# Patient Record
Sex: MALE | Race: OTHER | NOT HISPANIC OR LATINO
[De-identification: names, ages, dates, MRNs, and addresses within clinical notes are randomized per-mention and may not be internally consistent; named-entity substitution may affect disease eponyms.]

---

## 2017-12-11 ENCOUNTER — APPOINTMENT (OUTPATIENT)
Dept: SURGERY | Facility: CLINIC | Age: 72
End: 2017-12-11
Payer: MEDICARE

## 2017-12-11 VITALS
HEIGHT: 65 IN | TEMPERATURE: 97.7 F | BODY MASS INDEX: 25.9 KG/M2 | SYSTOLIC BLOOD PRESSURE: 170 MMHG | WEIGHT: 155.44 LBS | HEART RATE: 71 BPM | DIASTOLIC BLOOD PRESSURE: 90 MMHG | OXYGEN SATURATION: 97 %

## 2017-12-11 DIAGNOSIS — R22.31 LOCALIZED SWELLING, MASS AND LUMP, RIGHT UPPER LIMB: ICD-10-CM

## 2017-12-11 DIAGNOSIS — L72.3 SEBACEOUS CYST: ICD-10-CM

## 2017-12-11 PROCEDURE — 99204 OFFICE O/P NEW MOD 45 MIN: CPT

## 2017-12-21 PROBLEM — L72.3 SEBACEOUS CYST: Status: ACTIVE | Noted: 2017-12-21

## 2018-01-03 ENCOUNTER — FORM ENCOUNTER (OUTPATIENT)
Age: 73
End: 2018-01-03

## 2018-01-04 ENCOUNTER — OUTPATIENT (OUTPATIENT)
Dept: OUTPATIENT SERVICES | Facility: HOSPITAL | Age: 73
LOS: 1 days | End: 2018-01-04
Payer: MEDICARE

## 2018-01-04 PROCEDURE — 76641 ULTRASOUND BREAST COMPLETE: CPT

## 2018-01-04 PROCEDURE — 76536 US EXAM OF HEAD AND NECK: CPT

## 2018-01-04 PROCEDURE — 76536 US EXAM OF HEAD AND NECK: CPT | Mod: 26

## 2018-01-04 PROCEDURE — 76641 ULTRASOUND BREAST COMPLETE: CPT | Mod: 26,RT

## 2018-01-22 ENCOUNTER — APPOINTMENT (OUTPATIENT)
Dept: OTOLARYNGOLOGY | Facility: CLINIC | Age: 73
End: 2018-01-22
Payer: MEDICARE

## 2018-01-22 VITALS
SYSTOLIC BLOOD PRESSURE: 180 MMHG | HEIGHT: 65 IN | BODY MASS INDEX: 24.99 KG/M2 | DIASTOLIC BLOOD PRESSURE: 94 MMHG | WEIGHT: 150 LBS | HEART RATE: 74 BPM | TEMPERATURE: 97.7 F

## 2018-01-22 DIAGNOSIS — Z82.49 FAMILY HISTORY OF ISCHEMIC HEART DISEASE AND OTHER DISEASES OF THE CIRCULATORY SYSTEM: ICD-10-CM

## 2018-01-22 PROCEDURE — 31575 DIAGNOSTIC LARYNGOSCOPY: CPT

## 2018-01-22 PROCEDURE — 99204 OFFICE O/P NEW MOD 45 MIN: CPT | Mod: 25

## 2018-01-22 RX ORDER — SIMVASTATIN 10 MG/1
10 TABLET, FILM COATED ORAL
Refills: 0 | Status: ACTIVE | COMMUNITY

## 2019-11-19 ENCOUNTER — RECORD ABSTRACTING (OUTPATIENT)
Age: 74
End: 2019-11-19

## 2019-11-19 DIAGNOSIS — Z80.1 FAMILY HISTORY OF MALIGNANT NEOPLASM OF TRACHEA, BRONCHUS AND LUNG: ICD-10-CM

## 2019-11-19 LAB
PSA FREE FLD-MCNC: 14
PSA FREE SERPL-MCNC: 1.4
PSA SERPL-MCNC: 9.7
TESTOST BND SERPL-MCNC: 283.8

## 2019-12-13 ENCOUNTER — APPOINTMENT (OUTPATIENT)
Dept: UROLOGY | Facility: CLINIC | Age: 74
End: 2019-12-13
Payer: MEDICARE

## 2019-12-13 ENCOUNTER — APPOINTMENT (OUTPATIENT)
Dept: UROLOGY | Facility: CLINIC | Age: 74
End: 2019-12-13

## 2019-12-13 DIAGNOSIS — Z00.00 ENCOUNTER FOR GENERAL ADULT MEDICAL EXAMINATION W/OUT ABNORMAL FINDINGS: ICD-10-CM

## 2019-12-13 LAB
BILIRUB UR QL STRIP: NEGATIVE
CLARITY UR: NORMAL
COLLECTION METHOD: NORMAL
GLUCOSE UR-MCNC: NEGATIVE
HCG UR QL: 0.2 EU/DL
HGB UR QL STRIP.AUTO: NEGATIVE
KETONES UR-MCNC: NEGATIVE
LEUKOCYTE ESTERASE UR QL STRIP: NEGATIVE
NITRITE UR QL STRIP: NEGATIVE
PH UR STRIP: 7
PROT UR STRIP-MCNC: NORMAL
SP GR UR STRIP: 1.02

## 2019-12-13 PROCEDURE — 99215 OFFICE O/P EST HI 40 MIN: CPT

## 2019-12-13 PROCEDURE — 81003 URINALYSIS AUTO W/O SCOPE: CPT | Mod: QW

## 2019-12-13 PROCEDURE — 76857 US EXAM PELVIC LIMITED: CPT

## 2019-12-13 NOTE — LETTER BODY
[Consult Letter:] : I had the pleasure of evaluating your patient, [unfilled]. [Dear  ___] : Dear  [unfilled], [Consult Closing:] : Thank you very much for allowing me to participate in the care of this patient.  If you have any questions, please do not hesitate to contact me. [Sincerely,] : Sincerely, [Please see my note below.] : Please see my note below. [FreeTextEntry3] : Samy Blackwell MD, FACS\par

## 2019-12-13 NOTE — ASSESSMENT
[FreeTextEntry1] : I discussed the findings and options in detail with Mr. Chacko.  He will continue with the combination therapy for his LUTS.  I have also given him another prescription for the Cialis to try prn. \par \par I look forward to seeing Mr. Chacko in one year (sono, PSA), providing the PSA is stable.

## 2019-12-13 NOTE — HISTORY OF PRESENT ILLNESS
[FreeTextEntry1] : Mr. ROD BOURNE comes in today for his urologic follow-up.  He reports minimal stable LUTS with nocturia x1, and is continuing on alfuzosin and finasteride.\par IPSS: 1/35 (pleased)\par Sono:  91cc PVR; 174cc prostate\par \par He has a long history of elevated PSAs and he has had 4 prostate biopsies from 4881-7702.\par PSAs: 6/22/10--3.6;  2/24/11--3.6; 1/10/14--5.1; 9/24/14--4.7; 12/2/15--5.5; 9/14/16--5.5; 5/26/17--5.8; 5/23/18--5.6; 5/29/19--9.7 (14%); \par \par The ED is unchanged. He has not tried the Cialis, previously prescribed, because of his wife's hesitation.\par \par

## 2019-12-13 NOTE — PHYSICAL EXAM
[General Appearance - Well Developed] : well developed [General Appearance - Well Nourished] : well nourished [Normal Appearance] : normal appearance [Well Groomed] : well groomed [Abdomen Soft] : soft [General Appearance - In No Acute Distress] : no acute distress [Abdomen Tenderness] : non-tender [Urethral Meatus] : meatus normal [Costovertebral Angle Tenderness] : no ~M costovertebral angle tenderness [Penis Abnormality] : normal uncircumcised penis [Urinary Bladder Findings] : the bladder was normal on palpation [Scrotum] : the scrotum was normal [Testes Tenderness] : no tenderness of the testes [Rectal Exam - Rectum] : digital rectal exam was normal [Prostate Tenderness] : the prostate was not tender [Testes Mass (___cm)] : there were no testicular masses [No Prostate Nodules] : no prostate nodules [Skin Color & Pigmentation] : normal skin color and pigmentation [Heart Rate And Rhythm] : Heart rate and rhythm were normal [] : no respiratory distress [Edema] : no peripheral edema [Respiration, Rhythm And Depth] : normal respiratory rhythm and effort [Exaggerated Use Of Accessory Muscles For Inspiration] : no accessory muscle use [Oriented To Time, Place, And Person] : oriented to person, place, and time [Mood] : the mood was normal [Affect] : the affect was normal [Not Anxious] : not anxious [Normal Station and Gait] : the gait and station were normal for the patient's age [No Palpable Adenopathy] : no palpable adenopathy [No Focal Deficits] : no focal deficits [FreeTextEntry1] : MIld testicular asymmetry (Lt<Rt). External hemorrhoids

## 2019-12-16 LAB — PSA SERPL-MCNC: 7.2 NG/ML

## 2020-01-16 ENCOUNTER — FORM ENCOUNTER (OUTPATIENT)
Age: 75
End: 2020-01-16

## 2020-01-17 ENCOUNTER — OUTPATIENT (OUTPATIENT)
Dept: OUTPATIENT SERVICES | Facility: HOSPITAL | Age: 75
LOS: 1 days | End: 2020-01-17
Payer: MEDICARE

## 2020-01-17 ENCOUNTER — APPOINTMENT (OUTPATIENT)
Dept: MRI IMAGING | Facility: HOSPITAL | Age: 75
End: 2020-01-17
Payer: MEDICARE

## 2020-01-17 PROCEDURE — 72196 MRI PELVIS W/DYE: CPT

## 2020-01-17 PROCEDURE — 72196 MRI PELVIS W/DYE: CPT | Mod: 26

## 2020-01-17 PROCEDURE — A9585: CPT

## 2020-06-25 ENCOUNTER — APPOINTMENT (OUTPATIENT)
Dept: UROLOGY | Facility: CLINIC | Age: 75
End: 2020-06-25

## 2020-06-25 ENCOUNTER — APPOINTMENT (OUTPATIENT)
Dept: UROLOGY | Facility: CLINIC | Age: 75
End: 2020-06-25
Payer: MEDICARE

## 2020-06-25 VITALS
HEART RATE: 76 BPM | DIASTOLIC BLOOD PRESSURE: 90 MMHG | BODY MASS INDEX: 24.99 KG/M2 | WEIGHT: 150 LBS | SYSTOLIC BLOOD PRESSURE: 165 MMHG | HEIGHT: 65 IN | TEMPERATURE: 98 F

## 2020-06-25 DIAGNOSIS — Z78.9 OTHER SPECIFIED HEALTH STATUS: ICD-10-CM

## 2020-06-25 DIAGNOSIS — E78.5 HYPERLIPIDEMIA, UNSPECIFIED: ICD-10-CM

## 2020-06-25 LAB
BILIRUB UR QL STRIP: NORMAL
CLARITY UR: CLEAR
COLLECTION METHOD: NORMAL
GLUCOSE UR-MCNC: NORMAL
HCG UR QL: 0.2 EU/DL
HGB UR QL STRIP.AUTO: NORMAL
KETONES UR-MCNC: NORMAL
LEUKOCYTE ESTERASE UR QL STRIP: NORMAL
NITRITE UR QL STRIP: NORMAL
PH UR STRIP: 6.5
PROT UR STRIP-MCNC: 30
SP GR UR STRIP: 1.03

## 2020-06-25 PROCEDURE — 99214 OFFICE O/P EST MOD 30 MIN: CPT

## 2020-06-25 PROCEDURE — 81003 URINALYSIS AUTO W/O SCOPE: CPT | Mod: QW

## 2020-06-25 PROCEDURE — 76857 US EXAM PELVIC LIMITED: CPT

## 2020-06-25 RX ORDER — EZETIMIBE AND SIMVASTATIN 10; 80 MG/1; MG/1
TABLET ORAL
Refills: 0 | Status: DISCONTINUED | COMMUNITY
End: 2020-06-25

## 2020-06-25 NOTE — LETTER BODY
[Dear  ___] : Dear  [unfilled], [Consult Letter:] : I had the pleasure of evaluating your patient, [unfilled]. [Please see my note below.] : Please see my note below. [Consult Closing:] : Thank you very much for allowing me to participate in the care of this patient.  If you have any questions, please do not hesitate to contact me. [Sincerely,] : Sincerely, [FreeTextEntry3] : Samy Blackwell MD, FACS\par

## 2020-06-25 NOTE — ASSESSMENT
[FreeTextEntry1] : I discussed the findings and options with Mr. ROD BOURNE in detail.\par He will continue with the combination therapy for his voiding symptoms.  Mr. Bourne does not want any treatment for ED.\par \par I would like to see Mr. Bourne in one year (bladder sono).  He will be due for a PSA in Dec 2020.

## 2020-06-25 NOTE — HISTORY OF PRESENT ILLNESS
[FreeTextEntry1] : Mr. ROD BOURNE comes in today for his urologic follow-up.  He reports minimal stable lower urinary tract symptoms and nocturia x1. He is continuing on alfuzosin and finasteride. (Mr. Bourne had laser prostatectomies in 2007 and 2008).\par IPSS: 3/35\par Sono:  42cc PVR; 190cc prostate\par \par He has a long history of elevated PSAs (see below) and he has had 4 prostate biopsies from 9480-1600 (results not available).\par \par The ED is unchanged. He has not tried the Cialis, previously prescribed, because of his wife's hesitation.\par \par (There is a telephone communication in the chart from Feb 2020 suggesting that Mr. Bourne had gross hematuria.  He does not recall this and feels it is an error.  He denies every seeing blood in the urine).\par \par PSAs: 6/22/10--3.6;  2/24/11--3.6; 1/10/14--5.1; 9/24/14--4.7; 12/2/15--5.5; 9/14/16--5.5; 5/26/17--5.8; 5/23/18--5.6; 5/29/19--9.7 (14%); 12/13/19--7.2\par \par

## 2020-06-25 NOTE — PHYSICAL EXAM
[General Appearance - Well Developed] : well developed [Well Groomed] : well groomed [General Appearance - In No Acute Distress] : no acute distress [Normal Appearance] : normal appearance [General Appearance - Well Nourished] : well nourished [Abdomen Soft] : soft [Costovertebral Angle Tenderness] : no ~M costovertebral angle tenderness [Abdomen Tenderness] : non-tender [Penis Abnormality] : normal uncircumcised penis [Urinary Bladder Findings] : the bladder was normal on palpation [Urethral Meatus] : meatus normal [Testes Tenderness] : no tenderness of the testes [Rectal Exam - Rectum] : digital rectal exam was normal [Scrotum] : the scrotum was normal [Testes Mass (___cm)] : there were no testicular masses [No Prostate Nodules] : no prostate nodules [Skin Color & Pigmentation] : normal skin color and pigmentation [Prostate Tenderness] : the prostate was not tender [Heart Rate And Rhythm] : Heart rate and rhythm were normal [Edema] : no peripheral edema [Respiration, Rhythm And Depth] : normal respiratory rhythm and effort [Exaggerated Use Of Accessory Muscles For Inspiration] : no accessory muscle use [Oriented To Time, Place, And Person] : oriented to person, place, and time [] : no respiratory distress [Affect] : the affect was normal [Mood] : the mood was normal [Not Anxious] : not anxious [Normal Station and Gait] : the gait and station were normal for the patient's age [No Palpable Adenopathy] : no palpable adenopathy [No Focal Deficits] : no focal deficits [Abdomen Mass (___ Cm)] : no abdominal mass palpated [Abdomen Hernia] : no hernia was discovered [Epididymis] : the epididymides were normal [FreeTextEntry1] : MIld testicular asymmetry (Lt<Rt). External hemorrhoids

## 2020-06-26 LAB
APPEARANCE: CLEAR
BACTERIA: NEGATIVE
BILIRUBIN URINE: NEGATIVE
BLOOD URINE: NEGATIVE
COLOR: NORMAL
GLUCOSE QUALITATIVE U: NEGATIVE
HYALINE CASTS: 4 /LPF
KETONES URINE: NEGATIVE
LEUKOCYTE ESTERASE URINE: ABNORMAL
MICROSCOPIC-UA: NORMAL
NITRITE URINE: NEGATIVE
PH URINE: 6.5
PROTEIN URINE: ABNORMAL
RED BLOOD CELLS URINE: 2 /HPF
SPECIFIC GRAVITY URINE: 1.02
SQUAMOUS EPITHELIAL CELLS: 8 /HPF
URINE CYTOLOGY: NORMAL
UROBILINOGEN URINE: NORMAL
WHITE BLOOD CELLS URINE: 11 /HPF

## 2020-06-29 LAB — BACTERIA UR CULT: NORMAL

## 2021-03-08 ENCOUNTER — APPOINTMENT (OUTPATIENT)
Dept: UROLOGY | Facility: CLINIC | Age: 76
End: 2021-03-08
Payer: MEDICARE

## 2021-03-08 VITALS
BODY MASS INDEX: 24.99 KG/M2 | HEIGHT: 65 IN | SYSTOLIC BLOOD PRESSURE: 160 MMHG | OXYGEN SATURATION: 98 % | DIASTOLIC BLOOD PRESSURE: 88 MMHG | TEMPERATURE: 98 F | WEIGHT: 150 LBS | HEART RATE: 77 BPM

## 2021-03-08 VITALS — BODY MASS INDEX: 24.99 KG/M2 | HEIGHT: 65 IN | WEIGHT: 150 LBS

## 2021-03-08 LAB
BILIRUB UR QL STRIP: NORMAL
CLARITY UR: CLEAR
COLLECTION METHOD: NORMAL
GLUCOSE UR-MCNC: NORMAL
HCG UR QL: 0.2 EU/DL
HGB UR QL STRIP.AUTO: NORMAL
KETONES UR-MCNC: NORMAL
LEUKOCYTE ESTERASE UR QL STRIP: NORMAL
NITRITE UR QL STRIP: NORMAL
PH UR STRIP: 6
PROT UR STRIP-MCNC: NORMAL
SP GR UR STRIP: 1.01

## 2021-03-08 PROCEDURE — 76857 US EXAM PELVIC LIMITED: CPT

## 2021-03-08 PROCEDURE — 99215 OFFICE O/P EST HI 40 MIN: CPT

## 2021-03-08 PROCEDURE — 81003 URINALYSIS AUTO W/O SCOPE: CPT | Mod: QW

## 2021-03-08 RX ORDER — ATORVASTATIN CALCIUM 20 MG/1
20 TABLET, FILM COATED ORAL
Qty: 90 | Refills: 0 | Status: ACTIVE | COMMUNITY
Start: 2020-12-16

## 2021-03-08 RX ORDER — LISINOPRIL 20 MG/1
20 TABLET ORAL
Refills: 0 | Status: DISCONTINUED | COMMUNITY
End: 2021-03-08

## 2021-03-08 RX ORDER — AMLODIPINE BESYLATE 5 MG/1
5 TABLET ORAL
Qty: 90 | Refills: 0 | Status: ACTIVE | COMMUNITY
Start: 2020-12-16

## 2021-03-08 RX ORDER — LISINOPRIL AND HYDROCHLOROTHIAZIDE TABLETS 20; 12.5 MG/1; MG/1
20-12.5 TABLET ORAL
Qty: 180 | Refills: 0 | Status: ACTIVE | COMMUNITY
Start: 2020-07-10

## 2021-03-08 NOTE — PHYSICAL EXAM
[General Appearance - Well Developed] : well developed [General Appearance - Well Nourished] : well nourished [Normal Appearance] : normal appearance [Well Groomed] : well groomed [General Appearance - In No Acute Distress] : no acute distress [Abdomen Soft] : soft [Abdomen Tenderness] : non-tender [Abdomen Mass (___ Cm)] : no abdominal mass palpated [Abdomen Hernia] : no hernia was discovered [Costovertebral Angle Tenderness] : no ~M costovertebral angle tenderness [Urethral Meatus] : meatus normal [Penis Abnormality] : normal uncircumcised penis [Urinary Bladder Findings] : the bladder was normal on palpation [Scrotum] : the scrotum was normal [Epididymis] : the epididymides were normal [Testes Tenderness] : no tenderness of the testes [Testes Mass (___cm)] : there were no testicular masses [Rectal Exam - Rectum] : digital rectal exam was normal [Prostate Tenderness] : the prostate was not tender [No Prostate Nodules] : no prostate nodules [Skin Color & Pigmentation] : normal skin color and pigmentation [Heart Rate And Rhythm] : Heart rate and rhythm were normal [Edema] : no peripheral edema [] : no respiratory distress [Respiration, Rhythm And Depth] : normal respiratory rhythm and effort [Exaggerated Use Of Accessory Muscles For Inspiration] : no accessory muscle use [Oriented To Time, Place, And Person] : oriented to person, place, and time [Affect] : the affect was normal [Mood] : the mood was normal [Not Anxious] : not anxious [Normal Station and Gait] : the gait and station were normal for the patient's age [No Focal Deficits] : no focal deficits [No Palpable Adenopathy] : no palpable adenopathy [FreeTextEntry1] : MIld testicular asymmetry (Lt<Rt). External hemorrhoids

## 2021-03-08 NOTE — HISTORY OF PRESENT ILLNESS
[FreeTextEntry1] : Mr. ROD BOURNE comes in today for his urologic follow-up.  He reports minimal stable lower urinary tract symptoms and nocturia x1. He is continuing on alfuzosin and finasteride. Mr. Bourne had laser prostatectomies in 2007 and 2008.\par IPSS: \par Sono:  139cc PVR; 160cc prostate\par \par He has a long history of elevated PSAs (see below) and he has had 4 prostate biopsies from [id number] (results not available).\par \par The ED is unchanged. He has not tried the Cialis, previously prescribed, because of his wife's hesitation.\par \par (There is a telephone communication in the chart from Feb 2020 suggesting that Mr. Bourne had gross hematuria.  He does not recall this and feels it is an error.  He denies every seeing blood in the urine).\par \par PSAs: 6/22/10--3.6;  2/24/11--3.6; 1/10/14--5.1; 9/24/14--4.7; 12/2/15--5.5; 9/14/16--5.5; 5/26/17--5.8; 5/23/18--5.6; 5/29/19--9.7 (14%); 12/13/19--7.2\par \par

## 2021-03-08 NOTE — ADDENDUM
[FreeTextEntry1] : A portion of this note was written by [Miguel Angel Banks] on 03/05/2021 acting as a scribe for Dr. Blackwell. \par \par I have personally reviewed the chart and agree that the record accurately reflects my personal performance of the history, physical exam, assessment, and plan.

## 2021-03-08 NOTE — LETTER BODY
[Dear  ___] : Dear  [unfilled], [Consult Letter:] : I had the pleasure of evaluating your patient, [unfilled]. [Please see my note below.] : Please see my note below. [Consult Closing:] : Thank you very much for allowing me to participate in the care of this patient.  If you have any questions, please do not hesitate to contact me. [Sincerely,] : Sincerely, [DrAldo  ___] : Dr. HOANG [FreeTextEntry3] : Samy Blackwell MD, FACS\par

## 2021-03-08 NOTE — ASSESSMENT
[FreeTextEntry1] : I discussed the findings and options with . ROD BOURNE in detail.\par He will continue with the combination therapy for his voiding symptoms as he does not want any additional intervention.\par \par Mr. Bourne now seems more interested in pursuing treatment for his erectile dysfunction.  I again reviewed the options with him and prescribed Cialis 20 mg (1/2 dose). \par \par Providing there are no new problems and the PSA remains stable, I look forward to seeing Mr. Bourne, electively, in 1 year (bladder sono, PSA).

## 2021-03-09 ENCOUNTER — NON-APPOINTMENT (OUTPATIENT)
Age: 76
End: 2021-03-09

## 2021-03-09 LAB — PSA SERPL-MCNC: 7.38 NG/ML

## 2021-06-24 ENCOUNTER — APPOINTMENT (OUTPATIENT)
Dept: UROLOGY | Facility: CLINIC | Age: 76
End: 2021-06-24

## 2021-10-07 ENCOUNTER — APPOINTMENT (OUTPATIENT)
Dept: UROLOGY | Facility: CLINIC | Age: 76
End: 2021-10-07
Payer: MEDICARE

## 2021-10-07 VITALS
WEIGHT: 150 LBS | SYSTOLIC BLOOD PRESSURE: 157 MMHG | BODY MASS INDEX: 24.99 KG/M2 | DIASTOLIC BLOOD PRESSURE: 74 MMHG | HEIGHT: 65 IN | TEMPERATURE: 98.2 F | HEART RATE: 77 BPM

## 2021-10-07 DIAGNOSIS — Z78.9 OTHER SPECIFIED HEALTH STATUS: ICD-10-CM

## 2021-10-07 PROCEDURE — 99214 OFFICE O/P EST MOD 30 MIN: CPT

## 2021-10-07 PROCEDURE — 76857 US EXAM PELVIC LIMITED: CPT

## 2021-10-07 RX ORDER — ALFUZOSIN HYDROCHLORIDE 10 MG/1
10 TABLET, EXTENDED RELEASE ORAL DAILY
Qty: 90 | Refills: 3 | Status: ACTIVE | COMMUNITY
Start: 2019-12-13 | End: 1900-01-01

## 2021-10-07 NOTE — ASSESSMENT
[FreeTextEntry1] : I discussed the findings and options with . ROD BOURNE in detail.\par He will continue with the combination therapy for his voiding symptoms as he does not want any additional intervention.\par \par Mr. Bourne now seems more interested in pursuing treatment for his erectile dysfunction.  I again reviewed the options with him and re-prescribed Cialis 20 mg (1/2 dose). \par \par Providing there are no new problems and the PSA remains stable, I look forward to seeing Mr. Bourne, electively, in 1 year (bladder sono, PSA).

## 2021-10-07 NOTE — ADDENDUM
[FreeTextEntry1] : A portion of this note was written by [Miguel Angel Banks] on 06/22/2021 acting as a scribe for Dr. Blackwell. \par \par I have personally reviewed the chart and agree that the record accurately reflects my personal performance of the history, physical exam, assessment, and plan.

## 2021-10-07 NOTE — HISTORY OF PRESENT ILLNESS
[FreeTextEntry1] : Mr. ROD BOURNE comes in today for his semiannual urologic follow-up.  He reports minimal stable lower urinary tract symptoms and nocturia x1. He is continuing on alfuzosin and finasteride. Mr. Bourne had laser prostatectomies in 2007 and 2008.\par IPSS: 5/35\par Sono: 151cc PVR; 176cc prostate\par \par He has a long history of elevated PSAs (see below) and he has had 4 prostate biopsies from [id number] (results not available).\par \par The ED is unchanged. He has not tried the Cialis, previously prescribed, because of his wife's hesitation.\par \par (There is a telephone communication in the chart from Feb 2020 suggesting that Mr. Bourne had gross hematuria.  He does not recall this and feels it is an error.  He denies every seeing blood in the urine).\par \par PSAs: 6/22/10--3.6;  2/24/11--3.6; 1/10/14--5.1; 9/24/14--4.7; 12/2/15--5.5; 9/14/16--5.5; 5/26/17--5.8; 5/23/18--5.6; 5/29/19--9.7 (14%); 12/13/19--7.2; 3/8/21--7.38\par \par

## 2021-10-08 ENCOUNTER — NON-APPOINTMENT (OUTPATIENT)
Age: 76
End: 2021-10-08

## 2021-10-08 LAB — PSA SERPL-MCNC: 7.27 NG/ML

## 2021-10-15 ENCOUNTER — NON-APPOINTMENT (OUTPATIENT)
Age: 76
End: 2021-10-15

## 2022-04-14 ENCOUNTER — APPOINTMENT (OUTPATIENT)
Dept: UROLOGY | Facility: CLINIC | Age: 77
End: 2022-04-14
Payer: MEDICARE

## 2022-04-14 VITALS
HEART RATE: 85 BPM | RESPIRATION RATE: 18 BRPM | DIASTOLIC BLOOD PRESSURE: 79 MMHG | HEIGHT: 65 IN | SYSTOLIC BLOOD PRESSURE: 163 MMHG | TEMPERATURE: 98 F | WEIGHT: 145 LBS | BODY MASS INDEX: 24.16 KG/M2

## 2022-04-14 DIAGNOSIS — R97.20 ELEVATED PROSTATE, SPECIFIC ANTIGEN [PSA]: ICD-10-CM

## 2022-04-14 DIAGNOSIS — R35.1 NOCTURIA: ICD-10-CM

## 2022-04-14 DIAGNOSIS — R33.9 RETENTION OF URINE, UNSPECIFIED: ICD-10-CM

## 2022-04-14 DIAGNOSIS — Q55.9 CONGENITAL MALFORMATION OF MALE GENITAL ORGAN, UNSPECIFIED: ICD-10-CM

## 2022-04-14 DIAGNOSIS — N52.01 ERECTILE DYSFUNCTION DUE TO ARTERIAL INSUFFICIENCY: ICD-10-CM

## 2022-04-14 DIAGNOSIS — N53.14 RETROGRADE EJACULATION: ICD-10-CM

## 2022-04-14 DIAGNOSIS — R39.9 UNSPECIFIED SYMPTOMS AND SIGNS INVOLVING THE GENITOURINARY SYSTEM: ICD-10-CM

## 2022-04-14 PROCEDURE — 76857 US EXAM PELVIC LIMITED: CPT

## 2022-04-14 PROCEDURE — 99215 OFFICE O/P EST HI 40 MIN: CPT

## 2022-04-14 RX ORDER — FINASTERIDE 5 MG/1
5 TABLET, FILM COATED ORAL DAILY
Qty: 90 | Refills: 3 | Status: ACTIVE | COMMUNITY
Start: 2019-12-13 | End: 1900-01-01

## 2022-04-14 RX ORDER — TADALAFIL 20 MG/1
20 TABLET ORAL
Qty: 10 | Refills: 6 | Status: ACTIVE | COMMUNITY
Start: 2019-12-13 | End: 1900-01-01

## 2022-04-14 RX ORDER — HYDROCHLOROTHIAZIDE 12.5 MG/1
TABLET ORAL
Refills: 0 | Status: DISCONTINUED | COMMUNITY
End: 2022-04-14

## 2022-04-14 RX ORDER — ALFUZOSIN HYDROCHLORIDE 10 MG/1
10 TABLET, EXTENDED RELEASE ORAL
Qty: 90 | Refills: 3 | Status: ACTIVE | COMMUNITY
Start: 2022-03-15 | End: 1900-01-01

## 2022-04-14 NOTE — HISTORY OF PRESENT ILLNESS
[FreeTextEntry1] : Mr. ROD BOURNE comes in today for his semiannual urologic follow-up.  He reports minimal stable lower urinary tract symptoms and nocturia x 1. He is continuing on alfuzosin and finasteride. Mr. Bourne had laser prostatectomies in 2007 and 2008.\par IPSS: 5/35\par Sono (performed to assess bladder emptying): 102cc PVR; 190cc prostate\par \par He has a long history of elevated PSAs (see below) and he has had 4 prostate biopsies from [id number] (results not available).\par \par Mr. Bourne has longstanding ED.  He has used Cialis 20mg on occasion.\par \par PSAs: 10/7/21--7.27; 3/8/21--7.38; 12/13/19--7.2; 5/29/19--9.7 (14%); 5/23/18--5.6; 5/26/17--5.8; 9/14/16--5.5; 12/2/15--5.5; 9/24/14--4.7; 1/10/14--5.1; 2/24/11--3.6; 6/22/10--3.6;  \par \par

## 2022-04-14 NOTE — ASSESSMENT
[FreeTextEntry1] : I discussed the findings and options with . ROD BOURNE and his wife in detail.  He is satisfied with his urination and his residuals have remained stable.  Consequently we will simply continue on the combination pharmacologic therapy.\par \par Mr. Bourne will use Cialis either at the 10 or 20 mg dose as needed for the erectile dysfunction.\par \par Providing the PSA remains stable and there are no new problems, I look forward to seeing him in 1 year (bladder sono, PSA).

## 2022-04-14 NOTE — ADDENDUM
[FreeTextEntry1] : A portion of this note was written by [Miguel Angel Banks] on 04/12/2022 acting as a scribe for Dr. Blackwell. \par \par I have personally reviewed the chart and agree that the record accurately reflects my personal performance of the history, physical exam, assessment, and plan.

## 2022-04-15 LAB
BILIRUB UR QL STRIP: NORMAL
CLARITY UR: CLEAR
COLLECTION METHOD: NORMAL
GLUCOSE UR-MCNC: NORMAL
HCG UR QL: 0.2 EU/DL
HGB UR QL STRIP.AUTO: NORMAL
KETONES UR-MCNC: NORMAL
LEUKOCYTE ESTERASE UR QL STRIP: NORMAL
NITRITE UR QL STRIP: NORMAL
PH UR STRIP: 7.5
PROT UR STRIP-MCNC: 100
PSA SERPL-MCNC: 7.42 NG/ML
SP GR UR STRIP: 1.02

## 2022-04-18 ENCOUNTER — NON-APPOINTMENT (OUTPATIENT)
Age: 77
End: 2022-04-18

## 2022-06-21 ENCOUNTER — NON-APPOINTMENT (OUTPATIENT)
Age: 77
End: 2022-06-21